# Patient Record
Sex: MALE | Race: ASIAN | Employment: FULL TIME | URBAN - METROPOLITAN AREA
[De-identification: names, ages, dates, MRNs, and addresses within clinical notes are randomized per-mention and may not be internally consistent; named-entity substitution may affect disease eponyms.]

---

## 2019-03-13 ENCOUNTER — TELEPHONE (OUTPATIENT)
Dept: CARDIAC REHAB | Age: 59
End: 2019-03-13

## 2019-03-13 NOTE — TELEPHONE ENCOUNTER
3/13/2019 Cardiac Rehab: Called Mr. Ivana Cogan to remind of intake appointment on Thursday, 3/14/2019. Confirmed appointment with patient. Provided patient with contact information for [address] [place] [address] Cardiac Rehab. Also, reminded patient to bring a list of current medications, a personal schedule, and to wear comfortable clothes and shoes.  Donaldo Riley

## 2019-03-14 ENCOUNTER — HOSPITAL ENCOUNTER (OUTPATIENT)
Dept: CARDIAC REHAB | Age: 59
Discharge: HOME | End: 2019-03-14
Payer: COMMERCIAL

## 2019-03-14 VITALS — BODY MASS INDEX: 24.75 KG/M2 | WEIGHT: 154 LBS | HEIGHT: 66 IN

## 2019-03-14 VITALS — BODY MASS INDEX: 25.02 KG/M2 | WEIGHT: 155 LBS

## 2019-03-14 PROCEDURE — 93798 PHYS/QHP OP CAR RHAB W/ECG: CPT

## 2019-03-14 RX ORDER — POTASSIUM CHLORIDE 750 MG/1
10 TABLET, FILM COATED, EXTENDED RELEASE ORAL DAILY
COMMUNITY
End: 2019-03-14

## 2019-03-14 RX ORDER — POLYETHYLENE GLYCOL 3350 17 G/17G
17 POWDER, FOR SOLUTION ORAL DAILY
COMMUNITY
End: 2019-03-14

## 2019-03-14 RX ORDER — NITROGLYCERIN 0.4 MG/1
0.4 TABLET SUBLINGUAL
COMMUNITY

## 2019-03-14 RX ORDER — AMIODARONE HYDROCHLORIDE 400 MG/1
400 TABLET ORAL DAILY
COMMUNITY
End: 2019-03-14

## 2019-03-14 RX ORDER — PANTOPRAZOLE SODIUM 40 MG/1
40 TABLET, DELAYED RELEASE ORAL DAILY
COMMUNITY
End: 2019-03-14

## 2019-03-14 RX ORDER — TRAMADOL HYDROCHLORIDE 50 MG/1
50 TABLET ORAL
COMMUNITY

## 2019-03-14 RX ORDER — METOPROLOL TARTRATE 25 MG/1
12.5 TABLET, FILM COATED ORAL 2 TIMES DAILY
COMMUNITY

## 2019-03-14 RX ORDER — FUROSEMIDE 20 MG/1
20 TABLET ORAL DAILY
COMMUNITY
End: 2019-03-14

## 2019-03-14 RX ORDER — CLOPIDOGREL BISULFATE 75 MG/1
75 TABLET ORAL
COMMUNITY

## 2019-03-14 RX ORDER — TAMSULOSIN HYDROCHLORIDE 0.4 MG/1
0.4 CAPSULE ORAL DAILY
COMMUNITY
End: 2019-03-14

## 2019-03-14 RX ORDER — GUAIFENESIN 100 MG/5ML
81 LIQUID (ML) ORAL 2 TIMES DAILY
COMMUNITY

## 2019-03-14 NOTE — CARDIO/PULMONARY
Lorrie Huerta   66 y.o.    presented to cardiac wellness for orientation and 6 min walk test today with a primary diagnosis of NSTEMI & CABG x 4 (2/1/19). Lorrie Huerta has a history of a previous MI in 2009, which led to 2 stents. Cardiac risk factors include family history, dyslipidemia, hypertension, stress, prior MI and these were reviewed with the patient. Lorrie Huerta is  and lives at home with his wife and children. He works full time for [employer] running their [employer]. PHQ9, depression score, is 6 and this is considered mild. The result was discussed with patient who confirms score to be accurate, due to feelings of fatigue, but denies feelings of depression and therefore feels no intervention is necessary. Patient denied chest pain or SOB during 6 minute walk and was in NSR IVCD. Lorrie Huerta will attend a 60 minute class once a week and exercise 3 days a week in cardiac wellness. EF is 55%. Education manual and exercise schedule given to patient.

## 2019-03-18 ENCOUNTER — HOSPITAL ENCOUNTER (OUTPATIENT)
Dept: CARDIAC REHAB | Age: 59
Discharge: HOME | End: 2019-03-18
Payer: COMMERCIAL

## 2019-03-18 VITALS — BODY MASS INDEX: 25.18 KG/M2 | WEIGHT: 156 LBS

## 2019-03-18 PROCEDURE — 93798 PHYS/QHP OP CAR RHAB W/ECG: CPT

## 2019-03-19 ENCOUNTER — HOSPITAL ENCOUNTER (OUTPATIENT)
Dept: CARDIAC REHAB | Age: 59
Discharge: HOME | End: 2019-03-19
Payer: COMMERCIAL

## 2019-03-19 VITALS — WEIGHT: 153 LBS | BODY MASS INDEX: 24.69 KG/M2

## 2019-03-19 PROCEDURE — 93797 PHYS/QHP OP CAR RHAB WO ECG: CPT | Performed by: DIETITIAN, REGISTERED

## 2019-03-19 PROCEDURE — 93798 PHYS/QHP OP CAR RHAB W/ECG: CPT | Performed by: DIETITIAN, REGISTERED

## 2019-03-22 ENCOUNTER — HOSPITAL ENCOUNTER (OUTPATIENT)
Dept: CARDIAC REHAB | Age: 59
Discharge: HOME | End: 2019-03-22
Payer: COMMERCIAL

## 2019-03-22 ENCOUNTER — APPOINTMENT (OUTPATIENT)
Dept: CARDIAC REHAB | Age: 59
End: 2019-03-22
Payer: COMMERCIAL

## 2019-03-22 VITALS — WEIGHT: 154 LBS | BODY MASS INDEX: 24.86 KG/M2

## 2019-03-22 PROCEDURE — 93798 PHYS/QHP OP CAR RHAB W/ECG: CPT

## 2019-03-25 ENCOUNTER — HOSPITAL ENCOUNTER (OUTPATIENT)
Dept: CARDIAC REHAB | Age: 59
Discharge: HOME | End: 2019-03-25
Payer: COMMERCIAL

## 2019-03-25 VITALS — WEIGHT: 153 LBS | BODY MASS INDEX: 24.69 KG/M2

## 2019-03-25 PROCEDURE — 93798 PHYS/QHP OP CAR RHAB W/ECG: CPT

## 2019-03-25 NOTE — CARDIO/PULMONARY
Pt reports a stitch poking through his incision. Recommended contacting cardiac surgery office regarding care of this issue. Pt verbalized understanding.

## 2019-03-26 ENCOUNTER — HOSPITAL ENCOUNTER (OUTPATIENT)
Dept: CARDIAC REHAB | Age: 59
Discharge: HOME | End: 2019-03-26
Payer: COMMERCIAL

## 2019-03-26 VITALS — BODY MASS INDEX: 24.69 KG/M2 | WEIGHT: 153 LBS

## 2019-03-26 PROCEDURE — 93797 PHYS/QHP OP CAR RHAB WO ECG: CPT | Performed by: DIETITIAN, REGISTERED

## 2019-03-26 PROCEDURE — 93798 PHYS/QHP OP CAR RHAB W/ECG: CPT | Performed by: DIETITIAN, REGISTERED

## 2019-03-29 ENCOUNTER — HOSPITAL ENCOUNTER (OUTPATIENT)
Dept: CARDIAC REHAB | Age: 59
Discharge: HOME | End: 2019-03-29
Payer: COMMERCIAL

## 2019-03-29 VITALS — WEIGHT: 155 LBS | BODY MASS INDEX: 25.02 KG/M2

## 2019-03-29 PROCEDURE — 93798 PHYS/QHP OP CAR RHAB W/ECG: CPT

## 2019-04-01 ENCOUNTER — HOSPITAL ENCOUNTER (OUTPATIENT)
Dept: CARDIAC REHAB | Age: 59
Discharge: HOME | End: 2019-04-01
Payer: COMMERCIAL

## 2019-04-01 VITALS — BODY MASS INDEX: 25.18 KG/M2 | WEIGHT: 156 LBS

## 2019-04-01 PROCEDURE — 93798 PHYS/QHP OP CAR RHAB W/ECG: CPT

## 2019-05-08 ENCOUNTER — TELEPHONE (OUTPATIENT)
Dept: CARDIAC REHAB | Age: 59
End: 2019-05-08

## 2019-05-08 NOTE — TELEPHONE ENCOUNTER
5/8/2019: Cardiac Wellness: Called Mariel Knapp regarding absence from the Cardiac Wellness Program. Would like to return to CR, but has to call back to make schedule time changes.  Remy Brice

## 2019-06-24 ENCOUNTER — TELEPHONE (OUTPATIENT)
Dept: CARDIAC REHAB | Age: 59
End: 2019-06-24

## 2019-06-24 NOTE — TELEPHONE ENCOUNTER
6/24/2019: Cardiac Wellness: Brooklyn Parmar regarding absence from the Cardiac Wellness Program. Wants to return with different schedule, will call to make schedule change with patient. Lashay Villa     5/8/2019: Cardiac Wellness: Called Laisha Farias regarding absence from the Cardiac Wellness Program. Would like to return to CR, but has to call back to make schedule time changes.  Lashay Villa          Electronically signed by Queta Delong at 05/08/19 [id number]

## 2019-07-09 ENCOUNTER — TELEPHONE (OUTPATIENT)
Dept: CARDIAC REHAB | Age: 59
End: 2019-07-09

## 2019-07-09 NOTE — TELEPHONE ENCOUNTER
7/9/2019: Cardiac Wellness: Last call to Linda Zavala regarding absence from the Cardiac Wellness Program. He cannot participate since job will not work with him for a schedule to come in for CR. Discharge from CR with 11/36 sessions completed. Ashley Ramirez     6/24/2019: Cardiac Wellness: Called Linda Zavala regarding absence from the Cardiac Wellness Program. Wants to return with different schedule, will call to make schedule change with patient.  Ashley Ramirez      5/8/2019: Cardiac Wellness: Called Luzmaria Parmar regarding absence from the Cardiac Wellness Program. Would like to return to CR, but has to call back to make schedule time [facility]            Electronically signed by Efren Mancilla at 05/08/19 1118              Electronically signed by Efren Mancilla at 06/24/19 [id number]

## 2019-07-25 NOTE — CARDIO/PULMONARY
Jon Carter  64 y.o. With diagnosis of CABG and NSTEMI on 02/01/19, attended phase II cardiac rehab for 11 sessions from 03/14/19 - 04/01/19. He has not been able to continue due to his job.   He has been discharged from our program.     Solange Sifuentes RN  7/25/2019